# Patient Record
Sex: MALE | Race: WHITE | Employment: FULL TIME | URBAN - NONMETROPOLITAN AREA
[De-identification: names, ages, dates, MRNs, and addresses within clinical notes are randomized per-mention and may not be internally consistent; named-entity substitution may affect disease eponyms.]

---

## 2021-04-14 ENCOUNTER — HOSPITAL ENCOUNTER (EMERGENCY)
Age: 46
Discharge: HOME | End: 2021-04-14
Attending: INTERNAL MEDICINE
Payer: COMMERCIAL

## 2021-04-14 ENCOUNTER — APPOINTMENT (OUTPATIENT)
Dept: GENERAL RADIOLOGY | Age: 46
End: 2021-04-14
Attending: INTERNAL MEDICINE
Payer: COMMERCIAL

## 2021-04-14 VITALS
TEMPERATURE: 98.1 F | RESPIRATION RATE: 18 BRPM | SYSTOLIC BLOOD PRESSURE: 140 MMHG | OXYGEN SATURATION: 97 % | BODY MASS INDEX: 23.86 KG/M2 | HEART RATE: 60 BPM | WEIGHT: 180 LBS | HEIGHT: 73 IN | DIASTOLIC BLOOD PRESSURE: 104 MMHG

## 2021-04-14 DIAGNOSIS — M10.9 GOUT OF BIG TOE: Primary | ICD-10-CM

## 2021-04-14 LAB — URATE SERPL-MCNC: 5.4 MG/DL (ref 3.5–8.5)

## 2021-04-14 PROCEDURE — 96372 THER/PROPH/DIAG INJ SC/IM: CPT

## 2021-04-14 PROCEDURE — 84550 ASSAY OF BLOOD/URIC ACID: CPT

## 2021-04-14 PROCEDURE — 74011250636 HC RX REV CODE- 250/636: Performed by: INTERNAL MEDICINE

## 2021-04-14 PROCEDURE — 99283 EMERGENCY DEPT VISIT LOW MDM: CPT

## 2021-04-14 PROCEDURE — 73660 X-RAY EXAM OF TOE(S): CPT

## 2021-04-14 RX ORDER — KETOROLAC TROMETHAMINE 30 MG/ML
15 INJECTION, SOLUTION INTRAMUSCULAR; INTRAVENOUS
Status: COMPLETED | OUTPATIENT
Start: 2021-04-14 | End: 2021-04-14

## 2021-04-14 RX ORDER — METHYLPREDNISOLONE 4 MG/1
TABLET ORAL
Qty: 1 DOSE PACK | Refills: 0 | Status: SHIPPED | OUTPATIENT
Start: 2021-04-14 | End: 2021-05-05

## 2021-04-14 RX ORDER — NAPROXEN 500 MG/1
500 TABLET ORAL 2 TIMES DAILY WITH MEALS
Qty: 20 TAB | Refills: 0 | Status: SHIPPED | OUTPATIENT
Start: 2021-04-14 | End: 2021-04-24

## 2021-04-14 RX ORDER — DEXAMETHASONE 4 MG/1
4 TABLET ORAL ONCE
Status: COMPLETED | OUTPATIENT
Start: 2021-04-14 | End: 2021-04-14

## 2021-04-14 RX ADMIN — KETOROLAC TROMETHAMINE 15 MG: 30 INJECTION, SOLUTION INTRAMUSCULAR at 13:57

## 2021-04-14 RX ADMIN — DEXAMETHASONE 4 MG: 4 TABLET ORAL at 13:57

## 2021-04-14 NOTE — ED TRIAGE NOTES
Pt c/o of cellulitis a year ago in the left foot, c/o pain to the bottom of foot. Medial right toe swollen and red.

## 2021-04-14 NOTE — LETTER
[facility] EMERGENCY DEPT 
[facility] [address] 
[phone] Work/School Note Date: 4/14/2021 To Whom It May concern: 
 
Mamie Has was seen and treated today in the emergency room by the following provider(s): 
Attending Provider: Rea Samuels MD.   
 
Mamie Has is excused from work/school on 04/14/21 and 04/15/21. He is medically clear to return to work/school on 4/16/2021.   
 
 
Sincerely,

## 2021-04-14 NOTE — ED PROVIDER NOTES
EMERGENCY DEPARTMENT HISTORY AND PHYSICAL EXAM      Date: 4/14/2021  Patient Name: Mamie Jon    History of Presenting Illness     Chief Complaint   Patient presents with    Foot Pain       History Provided By: Patient    HPI: Mamie Jon, 50 y.o. male with a past medical history significant No significant past medical history presents to the ED with cc of pain and swelling of the right big toe since last night. Pt states that he had similar swelling of the left big toe last  Year and was told that it was cellulitis. PMHx: NC; NKDA. There are no other complaints, changes, or physical findings at this time. PCP: None    No current facility-administered medications on file prior to encounter. No current outpatient medications on file prior to encounter. Past History     Past Medical History:  History reviewed. No pertinent past medical history. Past Surgical History:  History reviewed. No pertinent surgical history. Family History:  History reviewed. No pertinent family history. Social History:  Social History     Tobacco Use    Smoking status: Current Every Day Smoker     Packs/day: 1.00    Smokeless tobacco: Never Used   Substance Use Topics    Alcohol use: Yes     Comment: 6 pack a day    Drug use: Not Currently       Allergies:  No Known Allergies      Review of Systems     Review of Systems   Constitutional: Negative for chills and fever. HENT: Negative for sore throat. Eyes: Negative for visual disturbance. Respiratory: Negative for chest tightness and shortness of breath. Cardiovascular: Negative for chest pain and leg swelling. Gastrointestinal: Negative for abdominal distention, nausea and vomiting. Genitourinary: Negative for discharge, dysuria and flank pain. Musculoskeletal: Positive for arthralgias and joint swelling. Skin: Negative for rash and wound. Neurological: Negative for dizziness and headaches.    Psychiatric/Behavioral: Negative for agitation. Physical Exam     Physical Exam  Vitals signs and nursing note reviewed. Constitutional:       Appearance: Normal appearance. Eyes:      Extraocular Movements: Extraocular movements intact. Pupils: Pupils are equal, round, and reactive to light. Neck:      Musculoskeletal: Neck supple. Cardiovascular:      Rate and Rhythm: Normal rate and regular rhythm. Pulses: Normal pulses. Heart sounds: Normal heart sounds. Pulmonary:      Effort: Pulmonary effort is normal.      Breath sounds: Normal breath sounds. Abdominal:      Palpations: Abdomen is soft. Tenderness: There is no abdominal tenderness. Musculoskeletal:      Right lower leg: No edema. Left lower leg: No edema. Comments: Redness; TTP of the right big toe; looks like gout to me;normal pulses   Skin:     General: Skin is warm. Capillary Refill: Capillary refill takes less than 2 seconds. Findings: Erythema present. Neurological:      General: No focal deficit present. Mental Status: He is alert. Psychiatric:         Mood and Affect: Mood normal.         Lab and Diagnostic Study Results     Labs -   No results found for this or any previous visit (from the past 12 hour(s)). Radiologic Studies -   @lastxrresult@  CT Results  (Last 48 hours)    None        CXR Results  (Last 48 hours)    None            Medical Decision Making   - I am the first provider for this patient. - I reviewed the vital signs, available nursing notes, past medical history, past surgical history, family history and social history. - Initial assessment performed. The patients presenting problems have been discussed, and they are in agreement with the care plan formulated and outlined with them. I have encouraged them to ask questions as they arise throughout their visit. Vital Signs-Reviewed the patient's vital signs.   Patient Vitals for the past 12 hrs:   Temp Pulse Resp BP SpO2   04/14/21 1313 98.1 °F (36.7 °C) 60 18 (!) 140/104 97 %       Records Reviewed: Nursing Notes    ED Course:          Procedures   M    Disposition   Disposition: {    Discharged    DISCHARGE PLAN:  1. There are no discharge medications for this patient. 2.   Follow-up Information     Follow up With Specialties Details Why Bakari Chacko MD Internal Medicine Schedule an appointment as soon as possible for a visit in 1 week  [address] [address]  [phone]          3. Return to ED if worse   4. Current Discharge Medication List      START taking these medications    Details   naproxen (Naprosyn) 500 mg tablet Take 1 Tab by mouth two (2) times daily (with meals) for 10 days. Qty: 20 Tab, Refills: 0      methylPREDNISolone (Medrol, Michael,) 4 mg tablet Use as directed on package  Qty: 1 Dose Pack, Refills: 0               Diagnosis     Clinical Impression:   1. Gout of big toe        Attestations:    Esperanza Mac MD    Please note that this dictation was completed with Fii[employer], the computer voice recognition software. Quite often unanticipated grammatical, syntax, homophones, and other interpretive errors are inadvertently transcribed by the computer software. Please disregard these errors. Please excuse any errors that have escaped final proofreading. Thank you.

## 2021-05-05 ENCOUNTER — OFFICE VISIT (OUTPATIENT)
Dept: INTERNAL MEDICINE CLINIC | Age: 46
End: 2021-05-05
Payer: COMMERCIAL

## 2021-05-05 VITALS
HEIGHT: 73 IN | DIASTOLIC BLOOD PRESSURE: 85 MMHG | RESPIRATION RATE: 18 BRPM | TEMPERATURE: 98.1 F | HEART RATE: 70 BPM | OXYGEN SATURATION: 96 % | SYSTOLIC BLOOD PRESSURE: 138 MMHG | BODY MASS INDEX: 24.18 KG/M2 | WEIGHT: 182.4 LBS

## 2021-05-05 DIAGNOSIS — M25.511 CHRONIC RIGHT SHOULDER PAIN: ICD-10-CM

## 2021-05-05 DIAGNOSIS — F10.90 HEAVY ALCOHOL USE: ICD-10-CM

## 2021-05-05 DIAGNOSIS — K29.20 ACUTE ALCOHOLIC GASTRITIS WITHOUT HEMORRHAGE: ICD-10-CM

## 2021-05-05 DIAGNOSIS — Z00.00 ANNUAL PHYSICAL EXAM: Primary | ICD-10-CM

## 2021-05-05 DIAGNOSIS — G89.29 CHRONIC RIGHT SHOULDER PAIN: ICD-10-CM

## 2021-05-05 DIAGNOSIS — R16.0 HEPATOMEGALY: ICD-10-CM

## 2021-05-05 DIAGNOSIS — Z72.0 TOBACCO ABUSE: ICD-10-CM

## 2021-05-05 DIAGNOSIS — R10.11 RIGHT UPPER QUADRANT PAIN: ICD-10-CM

## 2021-05-05 DIAGNOSIS — M10.9 ACUTE GOUT INVOLVING TOE OF RIGHT FOOT, UNSPECIFIED CAUSE: ICD-10-CM

## 2021-05-05 PROCEDURE — 99386 PREV VISIT NEW AGE 40-64: CPT | Performed by: INTERNAL MEDICINE

## 2021-05-05 PROCEDURE — 99204 OFFICE O/P NEW MOD 45 MIN: CPT | Performed by: INTERNAL MEDICINE

## 2021-05-05 RX ORDER — OMEPRAZOLE 20 MG/1
20 CAPSULE, DELAYED RELEASE ORAL DAILY
Qty: 60 CAP | Refills: 1 | Status: SHIPPED | OUTPATIENT
Start: 2021-05-05 | End: 2021-11-17 | Stop reason: CLARIF

## 2021-05-05 NOTE — PROGRESS NOTES
1. Annual physical exam  Exam performed  - HEMOGLOBIN A1C WITH EAG  - LIPID PANEL    2. Chronic right shoulder pain  Suspect he has a partial rotator cuff tear given he cannot lift his arm up over his head. We will start with an x-ray of his shoulder. He will eventually need a orthopedic referral  - XR SHOULDER RT AP/LAT MIN 2 V; Future    3. Acute gout involving toe of right foot, unspecified cause  Resolving. 4. Tobacco abuse  Total time spent in tobacco cessation counseling was 7 minutes    5. Heavy alcohol use  Time spent discussing his heavy alcohol use approximately 12 minutes. We will check a CMP and CBC without differentialWe spent a lot of time today during this initial encounter getting to know each other. It is unknown to me if he will be able to significantly cut back his drinking without being in a detox program for at least several days. At this time he is going to cut back his drinking by 50% and see how he does. - METABOLIC PANEL, COMPREHENSIVE  - CBC W/O DIFF    6. Right upper quadrant pain  This is something that it has been off and on over the past month. We will check an ultrasound of his abdomen and also check an acute hepatitis panel. He reports someone told him he might have hepatitis. This would explain the hepatomegaly on exam  - US ABD LTD; Future  - HEPATITIS PANEL, ACUTE    7. Hepatomegaly  Check a right upper quadrant us    8. Acute alcoholic gastritis without hemorrhage  Prilosec 20 mg twice daily  - omeprazole (PRILOSEC) 20 mg capsule; Take 1 Cap by mouth daily. Dispense: 60 Cap; Refill: 1    . Chief Complaint   Patient presents with    New Patient        HPI   This is a 77-year-old gentleman who presents today as a new patient. He was seen in the emergency department on April 21 and diagnosed with gout. He was treated with a Medrol Dosepak and discharged home. The patient presents today to establish care. He reports the gout is nearly completely resolved.   He has some mild swelling around his proximal joint of his right great toe but the redness is pretty much gone. He admitted to me upon questioning that he also drinks at least a sixpack or more per day. He reports tremulousness anxiety and sometimes guilt over his drinking. He also complains of right upper quadrant pain and epigastric discomfort. He really noticed this several days ago but thought he had he ate some bad. He reports a small amount of weight loss and also that someone told him he might have hepatitis. He never sought treatment or really got it worked up. He also reports pain in his right shoulder that is been going on for months. He denies any injury but at one point at work he just noticed he could not lift his hand over the shoulder. He has pain noted over the lateral aspect of the right shoulder and the posterior aspect of his of his shoulder right over the scapula. He reports no change in strength. On a few final notes he smokes about a pack of cigarettes a day but is working to cut back and he admits to episodes of blackouts on his days off he denies any seizures  There is no problem list on file for this patient. No current outpatient medications on file prior to visit. No current facility-administered medications on file prior to visit. ROS  - GEN: no weight gain/loss, no fevers or chills  - HEENT: no vision changes, no tinnitus, no sore throat  - CV: no cp, palpitations or edema  - RESP: no sob, cough  - ABD: + n/v/ -d, no blood in stool + ruq pain  - : no dysuria or changes in freq.   - SKIN: no rashes, ulcers  - Neuro: no resting tremors, parasthesia in extremities, no headaches  - MS: No weakness in extremities, + pain right shoulder no gait abnormalities  - Psych: negative for depression + anxiety      Visit Vitals  /85   Pulse 70   Temp 98.1 °F (36.7 °C)   Resp 18   Ht 6' 1\" (1.854 m)   Wt 182 lb 6.4 oz (82.7 kg)   SpO2 96%   BMI 24.06 kg/m² Physical Exam  Constitutional:       Appearance: dishevelled appearance. weight. NAD and pleasant  HENT:      Head: Normocephalic. Nose: Nose normal.      Mouth/Throat:      Mouth: Mucous membranes are moist. Throat not inflammed  Eyes:      Extraocular Movements: Extraocular movements intact although slight rightward gaze preference noted on right. Anicteric but muddy sclera. Conjunctiva/sclera:      Pupils: Pupils are equal, round, and reactive to light. Cardiovascular:      Rate and Rhythm: tachycardic no m/r/g      Pulses: Normal pulses. Pulmonary:      Effort: No respiratory distress. Breath sounds: CTAB and No stridor. No rhonchi. Abdominal:      General: There is no distension.  Liver is palpable, TTP ruq and epigastrium with mild rebound  Neurological:      Mental Status: patient is alert and oriented times 3. + resting tremor, normal gait slight hyperreflexia noted at patellas     Cranial Nerves: cranial nerves grossly intact  Muskuloskeletal     Full ROM in extremities     Normal gait  Skin     Dry without lesions on examined areas, warm to the touch       Deferred  Psychiatry     Calm, normal affect, interacting normally

## 2021-05-05 NOTE — PROGRESS NOTES
Right shoulder pain with lifting right arm. Went to ER for gout in right great toe on 4/14/2021. Primitivo Henry presents today for   Chief Complaint   Patient presents with    New Patient       Is someone accompanying this pt? no  Is the patient using any DME equipment during OV? no    Depression Screening:  3 most recent PHQ Screens 5/5/2021   Little interest or pleasure in doing things Not at all   Feeling down, depressed, irritable, or hopeless Not at all   Total Score PHQ 2 0       Learning Assessment:  No flowsheet data found. Health Maintenance reviewed and discussed and ordered per Provider. Health Maintenance Due   Topic Date Due    Hepatitis C Screening  Never done    COVID-19 Vaccine (1) Never done    DTaP/Tdap/Td series (1 - Tdap) Never done    Lipid Screen  Never done   . Coordination of Care:  1. Have you been to the ER, urgent care clinic since your last visit? Hospitalized since your last visit? 4/14/2021 [facility]    2. Have you seen or consulted any other health care providers outside of the [address] [place] [address] since your last visit? Include any pap smears or colon screening.  no

## 2021-05-05 NOTE — LETTER
5/5/2021 3:12 PM 
 
Mr. Len Lyles [phone] To Whom it May Concern, 
 
Mr Renata Vallejo was seen by me and is  excused from work 5/6/21 due to necessary testing. Sincerely, Dorina Rodriguez MD

## 2021-05-06 ENCOUNTER — HOSPITAL ENCOUNTER (OUTPATIENT)
Dept: GENERAL RADIOLOGY | Age: 46
Discharge: HOME | End: 2021-05-06
Payer: COMMERCIAL

## 2021-05-06 ENCOUNTER — HOSPITAL ENCOUNTER (OUTPATIENT)
Dept: LAB | Age: 46
Discharge: HOME | End: 2021-05-06

## 2021-05-06 DIAGNOSIS — M25.511 CHRONIC RIGHT SHOULDER PAIN: ICD-10-CM

## 2021-05-06 DIAGNOSIS — G89.29 CHRONIC RIGHT SHOULDER PAIN: ICD-10-CM

## 2021-05-06 PROCEDURE — 99001 SPECIMEN HANDLING PT-LAB: CPT

## 2021-05-06 PROCEDURE — 73030 X-RAY EXAM OF SHOULDER: CPT

## 2021-05-07 LAB
CREATININE, EXTERNAL: 0.9
HBA1C MFR BLD HPLC: 5.4 %
LDL-C, EXTERNAL: 115

## 2021-05-10 ENCOUNTER — TELEPHONE (OUTPATIENT)
Dept: INTERNAL MEDICINE CLINIC | Age: 46
End: 2021-05-10

## 2021-05-10 DIAGNOSIS — G89.29 CHRONIC RIGHT SHOULDER PAIN: Primary | ICD-10-CM

## 2021-05-10 DIAGNOSIS — M25.511 CHRONIC RIGHT SHOULDER PAIN: Primary | ICD-10-CM

## 2021-05-10 NOTE — TELEPHONE ENCOUNTER
----- Message from Noman Mckoy MD sent at 5/6/2021  1:26 PM EDT -----  Some arthritis is present.  Refer to dr. Cyndi Roberts for eval. May need and steroid injection

## 2021-05-12 ENCOUNTER — HOSPITAL ENCOUNTER (OUTPATIENT)
Dept: ULTRASOUND IMAGING | Age: 46
Discharge: HOME | End: 2021-05-12
Attending: INTERNAL MEDICINE
Payer: COMMERCIAL

## 2021-05-12 DIAGNOSIS — R10.11 RIGHT UPPER QUADRANT PAIN: ICD-10-CM

## 2021-05-12 PROCEDURE — 76705 ECHO EXAM OF ABDOMEN: CPT

## 2021-05-20 ENCOUNTER — VIRTUAL VISIT (OUTPATIENT)
Dept: INTERNAL MEDICINE CLINIC | Age: 46
End: 2021-05-20
Payer: COMMERCIAL

## 2021-05-20 DIAGNOSIS — R76.8 HEPATITIS C ANTIBODY TEST POSITIVE: Primary | ICD-10-CM

## 2021-05-20 DIAGNOSIS — F10.10 ALCOHOL ABUSE: ICD-10-CM

## 2021-05-20 PROCEDURE — 99443 PR PHYS/QHP TELEPHONE EVALUATION 21-30 MIN: CPT | Performed by: INTERNAL MEDICINE

## 2021-05-20 NOTE — PROGRESS NOTES
Nicholas Ambrose presents today for   Chief Complaint   Patient presents with    Follow-up     2 week       Depression Screening:  3 most recent PHQ Screens 5/20/2021   Little interest or pleasure in doing things Not at all   Feeling down, depressed, irritable, or hopeless Not at all   Total Score PHQ 2 0       Learning Assessment:  No flowsheet data found. Health Maintenance reviewed and discussed and ordered per Provider. Health Maintenance Due   Topic Date Due    COVID-19 Vaccine (1) Never done    DTaP/Tdap/Td series (1 - Tdap) Never done   . Coordination of Care:  1. Have you been to the ER, urgent care clinic since your last visit? Hospitalized since your last visit? no    2. Have you seen or consulted any other health care providers outside of the [address] [place] [address] since your last visit? Include any pap smears or colon screening.  no

## 2021-05-20 NOTE — PROGRESS NOTES
I was at my office in [place], [place] while conducting this encounter. The patient is at home. Consent:  Patient and/or HIS/HER healthcare decision maker is aware that this patient-initiated Telehealth encounter is a billable service, with coverage as determined by patient's insurance carrier. Patient is aware that He/She may receive a bill and has provided verbal consent to proceed: Consent has been obtained within past 12 months of the date of this encounter. This virtual visit was conducted via Telephone    1. Hepatitis C antibody test positive  We had a long conversation regarding the significance of a positive hepatitis C result. This did not surprise Mr. Willie Langston and that he was told once before his hep C was positive but he never had confirmatory testing. Even his transaminitis with AST and ALT is in the 300s I suspect he has chronic hepatitis C. It could be possible that this is alcohol induced but given his alcohol consumption I do not think so. Will order hep C viral PCR- HEPATITIS C QT BY PCR WITH REFLEX GENOTYPE    2. Alcohol abuse  He is cut back from 6-3 beers per day. In light of this new evidence I have asked him to completely stop his alcohol consumption. I explained to him that the combination of hepatitis C and alcohol consumption will lead to cirrhosis. His ultrasound was also discussed with him and it did confirm the hepatomegaly I noticed on exam.  He is in agreement and will stop immediately   Media Information       Document Information    Lab Result   Lab Reports/[facility]/5.7.21 05/07/2021 00:00   Attached To: [facility] [place] Hgba1[facility] [[id number]]   Abstract on 5/18/21 with Jillian Rivera MD   Source Information    Courtney Francois  [facility] - Internal Medicine       Media Information       Document Information    Lab Result   Lab Reports/[facility]/5.7.21 05/07/2021 00:00   Attached To:    Amb Ext Creatinine [[id number]]   Abstract on 5/18/21 with aCon Bing Marie MD   Source Information    [facility] - Internal Medicine       Chief Complaint   Patient presents with    Follow-up     2 week        HPI   This is a pleasant 78-year-old gentleman who presented to my office as a new patient a few weeks ago. On exam it was noted that he had hepatomegaly and he admitted to heavy alcohol use. He had his labs done and they have reported he is being seen in follow-up. Transaminases taken show significant hepatitis with AST and ALTs in the 3-4 100s. He is also positive for hepatitis C on his antibody screen. He reports he is cut back from 6-3 beers per day and is not drinking any hard liquor. He reports overall he feels better now that he is not drinking as much. He has more energy and feels much better. He reports his shoulder pain is the same and he has appointment with orthopedics next week to have that evaluated. Current Outpatient Medications on File Prior to Visit   Medication Sig Dispense Refill    omeprazole (PRILOSEC) 20 mg capsule Take 1 Cap by mouth daily. 60 Cap 1     No current facility-administered medications on file prior to visit. There is no problem list on file for this patient.             Total Time Spent on this Encounter: greater than 21 minutes spent

## 2021-05-26 ENCOUNTER — HOSPITAL ENCOUNTER (OUTPATIENT)
Dept: LAB | Age: 46
Discharge: HOME | End: 2021-05-26

## 2021-05-26 PROCEDURE — 99001 SPECIMEN HANDLING PT-LAB: CPT

## 2021-06-08 ENCOUNTER — OFFICE VISIT (OUTPATIENT)
Dept: ORTHOPEDIC SURGERY | Age: 46
End: 2021-06-08
Payer: COMMERCIAL

## 2021-06-08 VITALS — BODY MASS INDEX: 24.52 KG/M2 | HEIGHT: 73 IN | WEIGHT: 185 LBS

## 2021-06-08 DIAGNOSIS — M25.511 RIGHT SHOULDER PAIN, UNSPECIFIED CHRONICITY: ICD-10-CM

## 2021-06-08 DIAGNOSIS — M75.51 BURSITIS OF RIGHT SHOULDER: Primary | ICD-10-CM

## 2021-06-08 PROCEDURE — 20611 DRAIN/INJ JOINT/BURSA W/US: CPT | Performed by: ORTHOPAEDIC SURGERY

## 2021-06-08 PROCEDURE — 99203 OFFICE O/P NEW LOW 30 MIN: CPT | Performed by: ORTHOPAEDIC SURGERY

## 2021-06-08 RX ORDER — TRIAMCINOLONE ACETONIDE 40 MG/ML
40 INJECTION, SUSPENSION INTRA-ARTICULAR; INTRAMUSCULAR ONCE
Status: COMPLETED | OUTPATIENT
Start: 2021-06-08 | End: 2021-06-08

## 2021-06-08 RX ORDER — LIDOCAINE HYDROCHLORIDE 10 MG/ML
9 INJECTION INFILTRATION; PERINEURAL ONCE
Status: COMPLETED | OUTPATIENT
Start: 2021-06-08 | End: 2021-06-08

## 2021-06-08 RX ADMIN — TRIAMCINOLONE ACETONIDE 40 MG: 40 INJECTION, SUSPENSION INTRA-ARTICULAR; INTRAMUSCULAR at 15:51

## 2021-06-08 RX ADMIN — LIDOCAINE HYDROCHLORIDE 9 ML: 10 INJECTION INFILTRATION; PERINEURAL at 15:50

## 2021-06-08 NOTE — LETTER
Sim Grimm 1975  
[id number]  
 
 
6/8/2021 I hereby authorize and direct Flaquito Alvarado MD, Judy Galvan, and whomever he may designate as his associate to perform upon myself the following procedure: 
 
Injection of: Kenalog, Supartz, Euflexxa, Orthovisc in the Right/Left ____________________. If any unforeseen condition arises in the course of the procedure, I further authorize him and his associated and/or assistant(s) to do whatever he/she deems advisable. The nature, purpose, benefits, risks, side effects, likelihood of achieving goals, and potential problems that might occur during recuperation, risks for not receiving the proposed care, treatment and services and alternatives of the procedure have been fully explained to me by my physician including, but not limited to: 
 
Swelling, joint pain, skin pigment changes, worsening of condition, and failure to improve. I acknowledge that no guarantee or assurance has been made to me as to the results that may be obtained or the likelihood of success. _______________________________________ Signature of patient or authorized representative [employer] [facility] fax: [phone]

## 2021-06-08 NOTE — PATIENT INSTRUCTIONS
Shoulder Pain: Care Instructions Your Care Instructions You can hurt your shoulder by using it too much during an activity, such as fishing or baseball. It can also happen as part of the everyday wear and tear of getting older. Shoulder injuries can be slow to heal, but your shoulder should get better with time. Your doctor may recommend a sling to rest your shoulder. If you have injured your shoulder, you may need testing and treatment. Follow-up care is a key part of your treatment and safety. Be sure to make and go to all appointments, and call your doctor if you are having problems. It's also a good idea to know your test results and keep a list of the medicines you take. How can you care for yourself at home? · Take pain medicines exactly as directed. ? If the doctor gave you a prescription medicine for pain, take it as prescribed. ? If you are not taking a prescription pain medicine, ask your doctor if you can take an over-the-counter medicine. ? Do not take two or more pain medicines at the same time unless the doctor told you to. Many pain medicines contain acetaminophen, which is Tylenol. Too much acetaminophen (Tylenol) can be harmful. · If your doctor recommends that you wear a sling, use it as directed. Do not take it off before your doctor tells you to. · Put ice or a cold pack on the sore area for 10 to 20 minutes at a time. Put a thin cloth between the ice and your skin. · If there is no swelling, you can put moist heat, a heating pad, or a warm cloth on your shoulder. Some doctors suggest alternating between hot and cold. · Rest your shoulder for a few days. If your doctor recommends it, you can then begin gentle exercise of the shoulder, but do not lift anything heavy. When should you call for help? Call 911 anytime you think you may need emergency care. For example, call if: 
  · You have chest pain or pressure. This may occur with: ? Sweating. ? Shortness of breath.  
? Nausea or vomiting. ? Pain that spreads from the chest to the neck, jaw, or one or both shoulders or arms. ? Dizziness or lightheadedness. ? A fast or uneven pulse. After calling 911, chew 1 adult-strength aspirin. Wait for an ambulance. Do not try to drive yourself.  
  · Your arm or hand is cool or pale or changes color. Call your doctor now or seek immediate medical care if: 
  · You have signs of infection, such as: 
? Increased pain, swelling, warmth, or redness in your shoulder. ? Red streaks leading from a place on your shoulder. ? Pus draining from an area of your shoulder. ? Swollen lymph nodes in your neck, armpits, or groin. ? A fever. Watch closely for changes in your health, and be sure to contact your doctor if: 
  · You cannot use your shoulder.  
  · Your shoulder does not get better as expected. Where can you learn more? Go to http://www.gray.com/ Enter Z648 in the search box to learn more about \"Shoulder Pain: Care Instructions. \" Current as of: November 16, 2020               Content Version: 12.8 © 2797-0590 [employer]. Care instructions adapted under license by [employer] (which disclaims liability or warranty for this information). If you have questions about a medical condition or this instruction, always ask your healthcare professional. [place] [address] any warranty or liability for your use of this information.

## 2021-06-08 NOTE — PROGRESS NOTES
Name: Alicia Laws    : 1975     Service Dept: [facility]    Patient's Pharmacies:    [facility] [address] [place] [address]  [place] [phone] [id number]  Phone: [phone] Fax: [phone]       Chief Complaint   Patient presents with    Shoulder Pain        Visit Vitals  Ht 6' 1\" (1.854 m)   Wt 185 lb (83.9 kg)   BMI 24.41 kg/m²      No Known Allergies   Current Outpatient Medications   Medication Sig Dispense Refill    omeprazole (PRILOSEC) 20 mg capsule Take 1 Cap by mouth daily. 60 Cap 1      Patient Active Problem List   Diagnosis Code    Alcohol abuse F10.10    Cellulitis of forearm, right L03. 113    Chronic hepatitis C without hepatic coma (HCC) B18.2    Tobacco abuse Z72.0      No family history on file. Social History     Socioeconomic History    Marital status: SINGLE     Spouse name: Not on file    Number of children: Not on file    Years of education: Not on file    Highest education level: Not on file   Tobacco Use    Smoking status: Current Every Day Smoker     Packs/day: 1.00    Smokeless tobacco: Never Used   Vaping Use    Vaping Use: Never used   Substance and Sexual Activity    Alcohol use: Yes     Comment: 6 pack a day    Drug use: Not Currently     Social Determinants of Health     Financial Resource Strain:     Difficulty of Paying Living Expenses:    Food Insecurity:     Worried About Running Out of Food in the Last Year:     [address] in the Last Year:    Transportation Needs:     Lack of Transportation (Medical):      Lack of Transportation (Non-Medical):    Physical Activity:     Days of Exercise per Week:     Minutes of Exercise per Session:    Stress:     Feeling of Stress :    Social Connections:     Frequency of Communication with Friends and Family:     Frequency of Social Gatherings with Friends and Family:     Attends [employer] Services:     Active Member of [employer] or Organizations:     Attends Club or Organization Meetings:     Marital Status:       No past surgical history on file. No past medical history on file. I have reviewed and agree with [address] [place] [address] Nw and ROS and intake form in chart and the record furthermore I have reviewed prior medical record(s) regarding this patients care during this appointment. Review of Systems:   Patient is a pleasant appearing individual, appropriately dressed, well hydrated, well nourished, who is alert, appropriately oriented for age, and in no acute distress with a normal gait and normal affect who does not appear to be in any significant pain. Physical Exam:  Right Shoulder - Grossly neurovascularly intact. Range of motion-Full passive, Active with impingement. No Point tenderness, Strength-weakness with abduction, some mild crepitation, No skin lesion are identified, No instabilty is noted, No apprehension. No Swelling. Left Shoulder - Grossly neurovascularly intact, Full Range of motion, No point tenderness, No weakness, No skin lesions, No Instability, No apprehension, No swelling. Procedure Documentation:    I discussed in detail the risks, benefits and complications of an injection which included but are not limited to infection, skin reactions, hot swollen joint, and anaphylaxis with the patient. The patient verbalized understanding and gave informed consent for the injection. The patient's right shoulder were prepped using sterile alcohol solution. A sterile needle was inserted into the right shoulder and the mixture of 9 mL Lidocaine 1%, 1 mL Kenalog 40 mg was injected under sterile technique. The needle was withdrawn and the puncture site sealed with a Band-Aid. Technique: Under sterile conditions a [employer] ultrasound unit with a variable frequency (7.0-14.0 MHz) linear transducer was used to localize the placement of needle into the right joint. Findings: Successful needle placement for shoulder injection. Final images were taken and saved for permanent record. The patient tolerated the injection well. The patient was instructed to call the office immediately if there is any pain, redness, warmth, fever, or chills. Encounter Diagnoses     ICD-10-CM ICD-9-CM   1. Bursitis of right shoulder  M75.51 726.10   2. Right shoulder pain, unspecified chronicity  M25.511 719.41       HPI:  The patient is here with a chief complaint of right shoulder pain, sharp, throbbing pain, progressively getting worse. X-rays are unremarkable, done at [facility].  Pain is 5/10. Assessment/Plan:  1. Right shoulder bursitis. Plan will be for cortisone injection. We will see the patient back in 1 week for routine followup. If no better, we will consider an MRI and go from there. As part of continued conservative pain management options the patient was advised to utilize Tylenol or OTC NSAIDS as long as it is not medically contraindicated. Return to Office: Follow-up and Dispositions    · Return in about 1 week (around 6/15/2021). Administrations This Visit     lidocaine (XYLOCAINE) 10 mg/mL (1 %) injection 9 mL     Admin Date  06/08/2021 Action  Given Dose  9 mL Route  Other Administered By  Adalid Mccabe LPN          triamcinolone acetonide (KENALOG-40) 40 mg/mL injection 40 mg     Admin Date  06/08/2021 Action  Given Dose  40 mg Route  Intra artICUlar Administered By  Adalid Mccabe LPN               Scribed by Refugio Spatz, LPN as dictated by [facility] RESPONSE [place] ANA Ogden Asp, MD.  Documentation True and Accepted Flaquito Ogden Asp, MD

## 2021-11-17 ENCOUNTER — HOSPITAL ENCOUNTER (EMERGENCY)
Age: 46
Discharge: HOME | End: 2021-11-17
Attending: INTERNAL MEDICINE
Payer: COMMERCIAL

## 2021-11-17 VITALS
HEIGHT: 73 IN | RESPIRATION RATE: 16 BRPM | DIASTOLIC BLOOD PRESSURE: 89 MMHG | SYSTOLIC BLOOD PRESSURE: 124 MMHG | TEMPERATURE: 98.6 F | HEART RATE: 82 BPM | OXYGEN SATURATION: 98 % | WEIGHT: 190 LBS | BODY MASS INDEX: 25.18 KG/M2

## 2021-11-17 DIAGNOSIS — Z20.822 PERSON UNDER INVESTIGATION FOR COVID-19: ICD-10-CM

## 2021-11-17 DIAGNOSIS — J41.1 MUCOPURULENT CHRONIC BRONCHITIS (HCC): Primary | ICD-10-CM

## 2021-11-17 LAB — SARS-COV-2, COV2: NORMAL

## 2021-11-17 PROCEDURE — 99282 EMERGENCY DEPT VISIT SF MDM: CPT

## 2021-11-17 PROCEDURE — U0003 INFECTIOUS AGENT DETECTION BY NUCLEIC ACID (DNA OR RNA); SEVERE ACUTE RESPIRATORY SYNDROME CORONAVIRUS 2 (SARS-COV-2) (CORONAVIRUS DISEASE [COVID-19]), AMPLIFIED PROBE TECHNIQUE, MAKING USE OF HIGH THROUGHPUT TECHNOLOGIES AS DESCRIBED BY CMS-2020-01-R: HCPCS

## 2021-11-17 RX ORDER — AZITHROMYCIN 250 MG/1
TABLET, FILM COATED ORAL
Qty: 6 TABLET | Refills: 0 | Status: SHIPPED | OUTPATIENT
Start: 2021-11-17 | End: 2021-11-22

## 2021-11-17 NOTE — Clinical Note
[facility] EMERGENCY DEPT  [address] [id number] [phone]    Work/School Note    Date: 11/17/2021     To Whom It May concern:    Jacqueline Chaparro was evaluated by the following provider(s):  Attending Provider: Catalino Kerr [address] [place] [address] virus is suspected. Per the CDC guidelines we recommend home isolation until the following conditions are all met:    1. At least 10 days have passed since symptoms first appeared and  2. At least 24 hours have passed since last fever without the use of fever-reducing medications and  3.  Symptoms (e.g., cough, shortness of breath) have improved      Sincerely,          Danette Coronado MD

## 2021-11-17 NOTE — Clinical Note
[facility] EMERGENCY DEPT  [address] [id number] [phone]    Work/School Note    Date: 11/17/2021     To Whom It May concern:    Geneva Keene was evaluated by the following provider(s):  Attending Provider: William Carrillo [address] virus is suspected. Per the CDC guidelines we recommend home isolation until the following conditions are all met:    1. At least 10 days have passed since symptoms first appeared and  2. At least 24 hours have passed since last fever without the use of fever-reducing medications and  3.  Symptoms (e.g., cough, shortness of breath) have improved      Sincerely,          Sun Barron

## 2021-11-17 NOTE — ED TRIAGE NOTES
Patient states he started with sneezing and runny nose on Saturday. Each day he has gotten worse with a cough and nasal congestion developing. Denies fever. Intermittent chills. Denies being around others that are sick.

## 2021-11-17 NOTE — ED PROVIDER NOTES
EMERGENCY DEPARTMENT HISTORY AND PHYSICAL EXAM      Date: 11/17/2021  Patient Name: Reji Kruse    History of Presenting Illness     Chief Complaint   Patient presents with    Sore Throat    Cough    Nasal Congestion       History Provided By: Patient    HPI: Reji Kruse, 48 y.o. male with no significant past medical history that presents to the ED with cc of UrI sx. Pt states to gradual scratchy throat on Saturday followed by cough; body aches; headache; congestion. Has not had vaccine. No loss of smell or taste. Concerned with possible covid. There are no other complaints, changes, or physical findings at this time. PCP: Db Coelho MD    No current facility-administered medications on file prior to encounter. No current outpatient medications on file prior to encounter. Past History     Past Medical History:  History reviewed. No pertinent past medical history. Past Surgical History:  History reviewed. No pertinent surgical history. Family History:  History reviewed. No pertinent family history. Social History:  Social History     Tobacco Use    Smoking status: Current Every Day Smoker     Packs/day: 1.00    Smokeless tobacco: Never Used   Vaping Use    Vaping Use: Never used   Substance Use Topics    Alcohol use: Yes     Comment: 6 pack a day    Drug use: Not Currently       Allergies:  No Known Allergies      Review of Systems     Review of Systems   Constitutional: Negative for chills and fever. HENT: Positive for congestion and sore throat. Eyes: Negative for redness. Respiratory: Positive for cough. Negative for shortness of breath. Cardiovascular: Negative for chest pain and leg swelling. Gastrointestinal: Negative for diarrhea, nausea and vomiting. Genitourinary: Negative for dysuria. Musculoskeletal: Positive for arthralgias. Skin: Negative for rash. Neurological: Positive for headaches. Negative for weakness.    Psychiatric/Behavioral: Negative for confusion. Physical Exam     Physical Exam  Vitals and nursing note reviewed. Constitutional:       Appearance: He is well-developed. He is not diaphoretic. HENT:      Head: Normocephalic. Mouth/Throat:      Pharynx: Posterior oropharyngeal erythema present. No oropharyngeal exudate. Eyes:      Pupils: Pupils are equal, round, and reactive to light. Cardiovascular:      Rate and Rhythm: Normal rate and regular rhythm. Heart sounds: Normal heart sounds. No murmur heard. No friction rub. No gallop. Pulmonary:      Effort: Pulmonary effort is normal. No respiratory distress. Breath sounds: No wheezing or rales. Chest:      Chest wall: No tenderness. Abdominal:      General: Bowel sounds are normal. There is no distension. Palpations: Abdomen is soft. Tenderness: There is no abdominal tenderness. There is no guarding or rebound. Musculoskeletal:         General: No tenderness. Normal range of motion. Cervical back: Normal range of motion and neck supple. Skin:     General: Skin is warm and dry. Findings: No erythema or rash. Neurological:      Mental Status: He is alert and oriented to person, place, and time. Lab and Diagnostic Study Results     Labs -   No results found for this or any previous visit (from the past 12 hour(s)). Radiologic Studies -   @lastxrresult@  CT Results  (Last 48 hours)    None        CXR Results  (Last 48 hours)    None            Medical Decision Making   - I am the first provider for this patient. - I reviewed the vital signs, available nursing notes, past medical history, past surgical history, family history and social history. - Initial assessment performed. The patients presenting problems have been discussed, and they are in agreement with the care plan formulated and outlined with them. I have encouraged them to ask questions as they arise throughout their visit.     Vital Signs-Reviewed the patient's vital signs. Patient Vitals for the past 12 hrs:   Temp Pulse Resp BP SpO2   11/17/21 0845 98.6 °F (37 °C) 82 16 124/89 98 %       Records Reviewed: Nursing Notes    ED Course:          Procedures   M    Disposition   Disposition:     Discharged    DISCHARGE PLAN:  1. There are no discharge medications for this patient. 2.   Follow-up Information     Follow up With Specialties Details Why Aggie Rosen MD Internal Medicine Schedule an appointment as soon as possible for a visit in 1 week  [facility] [place] [address]  [phone]          3. Return to ED if worse   4. There are no discharge medications for this patient. Diagnosis     Clinical Impression:   1. Mucopurulent chronic bronchitis ([address]    2. Person under investigation for COVID-19        Attestations:    Lakeisha Mckay MD    Please note that this dictation was completed with [employer], the computer voice recognition software. Quite often unanticipated grammatical, syntax, homophones, and other interpretive errors are inadvertently transcribed by the computer software. Please disregard these errors. Please excuse any errors that have escaped final proofreading. Thank you.

## 2021-11-18 ENCOUNTER — PATIENT OUTREACH (OUTPATIENT)
Dept: CASE MANAGEMENT | Age: 46
End: 2021-11-18

## 2021-11-18 LAB — SARS-COV-2, COV2NT: NOT DETECTED

## 2021-11-18 NOTE — PROGRESS NOTES
Date/Time:  11/18/2021 11:16 AM   Call within 2 business days of discharge: Yes   Attempted to reach patient by telephone. Left HIPPA compliant message requesting a return call. Will attempt to reach patient again. Covid test pending.

## 2021-11-19 ENCOUNTER — PATIENT OUTREACH (OUTPATIENT)
Dept: CASE MANAGEMENT | Age: 46
End: 2021-11-19

## 2021-11-19 NOTE — PROGRESS NOTES
Date/Time:  11/19/2021 10:55 AM   Call within 2 business days of discharge: Yes   2nd attempt to reach patient by telephone. Left HIPPA compliant message requesting a return call. This episode is resolved. Covid test negative.

## 2023-06-27 ENCOUNTER — HOSPITAL ENCOUNTER (EMERGENCY)
Age: 48
Discharge: HOME | End: 2023-06-27
Attending: FAMILY MEDICINE
Payer: COMMERCIAL

## 2023-06-27 VITALS
HEART RATE: 65 BPM | BODY MASS INDEX: 25.18 KG/M2 | RESPIRATION RATE: 13 BRPM | WEIGHT: 190 LBS | OXYGEN SATURATION: 97 % | TEMPERATURE: 97.9 F | HEIGHT: 73 IN | DIASTOLIC BLOOD PRESSURE: 89 MMHG | SYSTOLIC BLOOD PRESSURE: 144 MMHG

## 2023-06-27 DIAGNOSIS — M54.31 SCIATICA OF RIGHT SIDE: Primary | ICD-10-CM

## 2023-06-27 DIAGNOSIS — M79.604 RIGHT LEG PAIN: ICD-10-CM

## 2023-06-27 PROCEDURE — 99283 EMERGENCY DEPT VISIT LOW MDM: CPT

## 2023-06-27 RX ORDER — CYCLOBENZAPRINE HCL 10 MG
10 TABLET ORAL 3 TIMES DAILY PRN
Qty: 9 TABLET | Refills: 0 | Status: SHIPPED | OUTPATIENT
Start: 2023-06-27

## 2023-06-27 RX ORDER — METHYLPREDNISOLONE 4 MG/1
TABLET ORAL
Qty: 1 KIT | Refills: 0 | Status: SHIPPED | OUTPATIENT
Start: 2023-06-27 | End: 2023-07-03

## 2023-06-27 ASSESSMENT — PAIN SCALES - GENERAL
PAINLEVEL_OUTOF10: 8
PAINLEVEL_OUTOF10: 8

## 2023-06-27 ASSESSMENT — PAIN DESCRIPTION - ORIENTATION: ORIENTATION: RIGHT

## 2023-06-27 ASSESSMENT — PAIN - FUNCTIONAL ASSESSMENT
PAIN_FUNCTIONAL_ASSESSMENT: 0-10
PAIN_FUNCTIONAL_ASSESSMENT: 0-10

## 2023-06-27 ASSESSMENT — PAIN DESCRIPTION - LOCATION: LOCATION: LEG

## 2025-04-23 ENCOUNTER — HOSPITAL ENCOUNTER (EMERGENCY)
Age: 50
Discharge: HOME | End: 2025-04-23
Attending: EMERGENCY MEDICINE

## 2025-04-23 VITALS
WEIGHT: 195 LBS | SYSTOLIC BLOOD PRESSURE: 150 MMHG | HEART RATE: 70 BPM | TEMPERATURE: 98.7 F | DIASTOLIC BLOOD PRESSURE: 90 MMHG | BODY MASS INDEX: 25.84 KG/M2 | HEIGHT: 73 IN | RESPIRATION RATE: 20 BRPM | OXYGEN SATURATION: 98 %

## 2025-04-23 DIAGNOSIS — L03.211 FACIAL CELLULITIS: Primary | ICD-10-CM

## 2025-04-23 PROCEDURE — 99283 EMERGENCY DEPT VISIT LOW MDM: CPT

## 2025-04-23 PROCEDURE — 6370000000 HC RX 637 (ALT 250 FOR IP): Performed by: EMERGENCY MEDICINE

## 2025-04-23 RX ORDER — IBUPROFEN 600 MG/1
600 TABLET, FILM COATED ORAL 3 TIMES DAILY PRN
Qty: 30 TABLET | Refills: 0 | Status: SHIPPED | OUTPATIENT
Start: 2025-04-23

## 2025-04-23 RX ORDER — ACETAMINOPHEN 500 MG
1000 TABLET ORAL 4 TIMES DAILY PRN
Qty: 30 TABLET | Refills: 0 | Status: SHIPPED | OUTPATIENT
Start: 2025-04-23

## 2025-04-23 RX ORDER — SULFAMETHOXAZOLE AND TRIMETHOPRIM 800; 160 MG/1; MG/1
1 TABLET ORAL ONCE
Status: COMPLETED | OUTPATIENT
Start: 2025-04-23 | End: 2025-04-23

## 2025-04-23 RX ORDER — SULFAMETHOXAZOLE AND TRIMETHOPRIM 800; 160 MG/1; MG/1
1 TABLET ORAL 2 TIMES DAILY
Qty: 14 TABLET | Refills: 0 | Status: SHIPPED | OUTPATIENT
Start: 2025-04-23 | End: 2025-04-30

## 2025-04-23 RX ADMIN — SULFAMETHOXAZOLE AND TRIMETHOPRIM 1 TABLET: 800; 160 TABLET ORAL at 17:52

## 2025-04-23 NOTE — ED TRIAGE NOTES
States he woke up with left sided facial swelling. States that it does not feel like a dental issue.

## 2025-04-23 NOTE — ED PROVIDER NOTES
[facility] EMERGENCY DEPT  EMERGENCY DEPARTMENT HISTORY AND PHYSICAL EXAM      Date of evaluation: 4/23/2025  Patient Name: Yordan Mac  Birthdate 1975  MRN: [id number]  ED Provider: Samuel Briggs MD   Note Started: 5:47 PM EDT 4/23/25    HISTORY OF PRESENT ILLNESS     Chief Complaint   Patient presents with    Facial Swelling       History Provided By: Patient, spouse     HPI: Yordan Mac is a 49 y.o. male Subjective:    Chief Complaint  Left-sided facial swelling, \"swollen ear\", possible infection from \"poking sore\" or \"pimple\"    History of Present Illness  - Mr. Mac is a 49-year-old male presenting with left-sided facial swelling that began this morning.  - Patient reports the swelling started as a \"poking sore\" or pimple on his lower lip.    - He believes he got some dust in it, leading to infection.  - Current symptoms include:    - Swollen ear    - Swelling inside his cheek    - Visible swelling near the lower lip  - Patient admits to scratching or pushing on the affected area.  - No dental pain reported.  - Patient expresses concern about potential exposure at work:    - Mentions sweeping, tying rebar     - Worried about wind and dust affecting the area.  - No allergies to medications reported.  - Denies any medical problems.    Allergies  - No known drug allergies    PAST MEDICAL HISTORY   Past Medical History:  History reviewed. No pertinent past medical history.    Past Surgical History:  History reviewed. No pertinent surgical history.    Family History:  History reviewed. No pertinent family history.    Social History:  Social History     Tobacco Use    Smoking status: Every Day     Current packs/day: 1.00     Types: Cigarettes    Smokeless tobacco: Never   Substance Use Topics    Alcohol use: Yes     Comment: reports at least 6 beers per day    Drug use: Not Currently       Allergies:  No Known Allergies    PCP: No, Pcp    Current Meds:   Current Facility-Administered Medications      These medications were sent to [facility] [address] [place] [address] - [address] [facility] - P [phone] - F [phone] [address] [facility] [address]      Phone: [phone]   acetaminophen 500 MG tablet  ibuprofen 600 MG tablet  sulfamethoxazole-trimethoprim 800-160 MG per tablet           DISCONTINUED MEDICATIONS:  Current Discharge Medication List          I am the Primary Clinician of Record. Samuel Briggs MD (electronically signed)    (Please note that parts of this dictation were completed with voice recognition software. Quite often unanticipated grammatical, syntax, homophones, and other interpretive errors are inadvertently transcribed by the computer software. Please disregards these errors. Please excuse any errors that have escaped final proofreading.)     Samuel Briggs MD  04/26/25 4053